# Patient Record
Sex: FEMALE | Race: WHITE | URBAN - METROPOLITAN AREA
[De-identification: names, ages, dates, MRNs, and addresses within clinical notes are randomized per-mention and may not be internally consistent; named-entity substitution may affect disease eponyms.]

---

## 2019-12-02 ENCOUNTER — TELEPHONE (OUTPATIENT)
Dept: NEUROLOGY | Facility: CLINIC | Age: 59
End: 2019-12-02

## 2019-12-02 NOTE — TELEPHONE ENCOUNTER
Called pt. She said her dad Juan Haro has ataxia and sees Dr. Esquivel. She said that there was just an article in the [employer] with Dr. Esquivel and her dad and they discussed ataxia. She said that since that time she has been thinking about it and wants to be tested.  Pt was told Dr. Esquivel does not have any days available in Dec. And he will not see pt's again until spring. She was asked if she would like to see another doctor before then and she said she would wait until Dr. Esquivel is available, it is not an emergency. She was told she would be called back when availability with Dr. Esquivel is found.         [facility]    Phone Message    May a detailed message be left on voicemail: yes    Reason for Call: patient is wanting to be tested to see if she has ataxia as he father has it, patient does know it can be hereditary and would just like peace of mind. She does have the stumbling at this moment but has not noticed anything else.     Action Taken: Message routed to:  [facility] ([facility] Neuro

## 2019-12-23 ENCOUNTER — TELEPHONE (OUTPATIENT)
Dept: NEUROLOGY | Facility: CLINIC | Age: 59
End: 2019-12-23

## 2019-12-23 NOTE — TELEPHONE ENCOUNTER
12/20/2019  Called pt to find out if she could see Dr. Edwar Esquivel on Dean. 3 at 9:30am. 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 She will call this writer's office phone back and after she finds out. She is a self referral. Her father is a paitent of Dr. Esquivel and she is wondering if she also has the disease of ataxia.

## 2019-12-30 ENCOUNTER — DOCUMENTATION ONLY (OUTPATIENT)
Dept: CARE COORDINATION | Facility: CLINIC | Age: 59
End: 2019-12-30

## 2019-12-30 ENCOUNTER — TELEPHONE (OUTPATIENT)
Dept: NEUROLOGY | Facility: CLINIC | Age: 59
End: 2019-12-30

## 2019-12-30 NOTE — TELEPHONE ENCOUNTER
Tried to contact pt via phone both cell and home but unable to reach her. Contacted Dr. Edwar Esquivel he will call personally and let her know she is scheduled for Dean 3 at 9:30am with Dr. Esquivel and Leo Jimenez. She has no medical records, she is concerned she may have the same disease as her father.

## 2020-01-02 ENCOUNTER — TELEPHONE (OUTPATIENT)
Dept: NEUROLOGY | Facility: CLINIC | Age: 60
End: 2020-01-02

## 2020-01-02 DIAGNOSIS — Z84.89 FAMILY HISTORY OF ATAXIA: Primary | ICD-10-CM

## 2020-01-02 NOTE — TELEPHONE ENCOUNTER
GENETIC COUNSELING-ataxia  I am unable to meet with Nyla when she comes to clinic tomorrow to see Dr. Esquivel. She has a family history of ataxia and is concerned that she may have symptoms. I am placing a genomics preauth order for Dr. Esquivel and will contact the patient by telephone if he considers that necessary    Leo Jimenez MS, [facility]  Licensed Genetic Counselor

## 2020-01-24 ENCOUNTER — TELEPHONE (OUTPATIENT)
Dept: NEUROLOGY | Facility: CLINIC | Age: 60
End: 2020-01-24

## 2020-01-24 NOTE — TELEPHONE ENCOUNTER
CAlled pt and arranged for her to see Dr. Edwar Esquivel on Feb. 28 at 9:30am. She said she had asked for Feb. 21st off because when she spoke to Dr. Esquivel that's when he said he could see her but that day is not available. She said she will change her schedule to get Feb 28th off.

## 2020-02-28 ENCOUNTER — OFFICE VISIT (OUTPATIENT)
Dept: NEUROLOGY | Facility: CLINIC | Age: 60
End: 2020-02-28
Payer: COMMERCIAL

## 2020-02-28 VITALS
DIASTOLIC BLOOD PRESSURE: 79 MMHG | HEART RATE: 61 BPM | OXYGEN SATURATION: 98 % | WEIGHT: 171.5 LBS | SYSTOLIC BLOOD PRESSURE: 112 MMHG

## 2020-02-28 DIAGNOSIS — R29.3 POSTURAL IMBALANCE: Primary | ICD-10-CM

## 2020-02-28 DIAGNOSIS — Z84.89 FAMILY HISTORY OF ATAXIA: Primary | ICD-10-CM

## 2020-02-28 ASSESSMENT — ENCOUNTER SYMPTOMS
SEIZURES: 0
PARALYSIS: 0
DIZZINESS: 1
HEADACHES: 1
SPEECH CHANGE: 0
TREMORS: 0
TINGLING: 1
NUMBNESS: 1
WEAKNESS: 0
LOSS OF CONSCIOUSNESS: 0
MEMORY LOSS: 0
DISTURBANCES IN COORDINATION: 0

## 2020-02-28 ASSESSMENT — PAIN SCALES - GENERAL: PAINLEVEL: MODERATE PAIN (4)
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
## 2020-02-28 NOTE — NURSING NOTE
Chief Complaint   Patient presents with     Consult     [facility] - CONSULTATION; FIRST VISIT       Bebo Watt, EMT
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
## 2020-02-28 NOTE — PROGRESS NOTES
GENETIC COUNSELING-Ataxia clinic  I met with Nyla for 20 minutes in the ataxia clinic at [facility]. We met to review her family history and discuss genetic testing options.    MEDICAL HISTORY-  Please see Dr. Esquivel's clinic notes. Briefly, Nyla is the daughter of one of our ataxia patients. She has some concerns that she may have the familial ataxia due to changes in her gait/balance.    FAMILY HISTORY-see scanned pedigree  1. Nyla's father is followed in our clinic for ataxia. We could not discuss specific details of his history, but she is aware that he DOES NOT have a specific genetic diagnosis at this point.  2. Nyla's father does have a family history of ataxia (documented in his chart)- but Nyla is not clear on the details of who is affected in my interview with her.  3. Nyla does not believe that any of her siblings has ataxia.  4. Nyla has two healthy sons.    GENETIC COUNSELING-  We discussed the genetic basis of ataxia. I explained that in the absence of a precise genetic diagnosis in the family, we cannot offer a gene test to Nyla.  All questions were answered.    Leo Bennett MS, [facility]  Licensed Genetic Counselor

## 2020-02-28 NOTE — LETTER
Date:March 9, 2020      Provider requested that no letter be sent. Do not send.       [facility] Information
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
